# Patient Record
Sex: MALE | Race: WHITE
[De-identification: names, ages, dates, MRNs, and addresses within clinical notes are randomized per-mention and may not be internally consistent; named-entity substitution may affect disease eponyms.]

---

## 2024-02-29 ENCOUNTER — APPOINTMENT (OUTPATIENT)
Dept: UROLOGY | Facility: CLINIC | Age: 59
End: 2024-02-29
Payer: COMMERCIAL

## 2024-02-29 DIAGNOSIS — Z12.5 ENCOUNTER FOR SCREENING FOR MALIGNANT NEOPLASM OF PROSTATE: ICD-10-CM

## 2024-02-29 DIAGNOSIS — Z78.9 OTHER SPECIFIED HEALTH STATUS: ICD-10-CM

## 2024-02-29 DIAGNOSIS — I10 ESSENTIAL (PRIMARY) HYPERTENSION: ICD-10-CM

## 2024-02-29 DIAGNOSIS — N52.01 ERECTILE DYSFUNCTION DUE TO ARTERIAL INSUFFICIENCY: ICD-10-CM

## 2024-02-29 PROBLEM — Z00.00 ENCOUNTER FOR PREVENTIVE HEALTH EXAMINATION: Status: ACTIVE | Noted: 2024-02-29

## 2024-02-29 PROCEDURE — 99204 OFFICE O/P NEW MOD 45 MIN: CPT

## 2024-02-29 RX ORDER — PAPAVERINE HYDROCHLORIDE 30 MG/ML
30 INJECTION, SOLUTION INTRAVENOUS
Qty: 10 | Refills: 5 | Status: ACTIVE | COMMUNITY
Start: 2024-02-29 | End: 1900-01-01

## 2024-02-29 RX ORDER — ATORVASTATIN CALCIUM 20 MG/1
20 TABLET, FILM COATED ORAL
Refills: 0 | Status: ACTIVE | COMMUNITY

## 2024-03-08 NOTE — ADDENDUM
[FreeTextEntry1] : after asking his insurance company - it was found that they would not cover an IPP

## 2024-03-08 NOTE — PLAN
[TextEntry] : Patient will continue to inject trimix. He knows that he can bring up the dose by 0.05mL at a time, every three days, so that he is able to get a properly firm erection for about 30 minutes or so.  He will hold pressure at the injection site for 5 minutes to prevent hematoma formation.  He knows to injected to alternating sides of the penis in order to prevent curving to one side.  He will inject at most, once every two days. He will go to ER if he develops a painful erection lasting more than 3 hours as this could possibly lead to worsening erectile dysfunction. He will need to follow up to determine if his treatment is going well.  A prescription to has been provided.  reassess in 4 weeks  psa in Feb 2025

## 2024-03-08 NOTE — HISTORY OF PRESENT ILLNESS
[FreeTextEntry1] : ED for six years - daily tadalafil as well of on demand dose on top also failed sildenafil tried and failed vacuum device  sex drive not the best on meds for depression - zoloft -- stopped  going through divorce  Feb 2024-- trimix injection with 0.2ml gave about 25% erection  PMH - cholesterol, HTN no heart attacks can walk two miles without chest pain  Feb 2024 IIEF - 13- mild/mod ED  FEb 2024 Labs from Dr Mahajan  Hct - 50.7 Cr - 0.94 Glucose 80 PSA 1.93 -- prostate cancer screening UA - neg blood

## 2024-04-05 ENCOUNTER — APPOINTMENT (OUTPATIENT)
Dept: UROLOGY | Facility: CLINIC | Age: 59
End: 2024-04-05